# Patient Record
Sex: MALE | Race: WHITE | NOT HISPANIC OR LATINO | Employment: FULL TIME | URBAN - NONMETROPOLITAN AREA
[De-identification: names, ages, dates, MRNs, and addresses within clinical notes are randomized per-mention and may not be internally consistent; named-entity substitution may affect disease eponyms.]

---

## 2023-11-28 ENCOUNTER — APPOINTMENT (OUTPATIENT)
Dept: GENERAL RADIOLOGY | Facility: HOSPITAL | Age: 23
End: 2023-11-28
Payer: OTHER MISCELLANEOUS

## 2023-11-28 ENCOUNTER — HOSPITAL ENCOUNTER (EMERGENCY)
Facility: HOSPITAL | Age: 23
Discharge: OTHER INSTITUTION | End: 2023-11-28
Attending: EMERGENCY MEDICINE
Payer: OTHER MISCELLANEOUS

## 2023-11-28 VITALS
DIASTOLIC BLOOD PRESSURE: 69 MMHG | BODY MASS INDEX: 37.89 KG/M2 | RESPIRATION RATE: 20 BRPM | TEMPERATURE: 98 F | OXYGEN SATURATION: 90 % | HEIGHT: 68 IN | WEIGHT: 250 LBS | HEART RATE: 136 BPM | SYSTOLIC BLOOD PRESSURE: 107 MMHG

## 2023-11-28 DIAGNOSIS — S61.215A LACERATION WITHOUT FOREIGN BODY OF LEFT RING FINGER WITHOUT DAMAGE TO NAIL, INITIAL ENCOUNTER: Primary | ICD-10-CM

## 2023-11-28 DIAGNOSIS — S61.213A LACERATION WITHOUT FOREIGN BODY OF LEFT MIDDLE FINGER WITHOUT DAMAGE TO NAIL, INITIAL ENCOUNTER: ICD-10-CM

## 2023-11-28 PROCEDURE — 99284 EMERGENCY DEPT VISIT MOD MDM: CPT

## 2023-11-28 PROCEDURE — 73130 X-RAY EXAM OF HAND: CPT

## 2023-11-28 PROCEDURE — 25010000002 LIDOCAINE 1 % SOLUTION: Performed by: NURSE PRACTITIONER

## 2023-11-28 RX ORDER — HYDROCODONE BITARTRATE AND ACETAMINOPHEN 5; 325 MG/1; MG/1
1 TABLET ORAL ONCE
Status: COMPLETED | OUTPATIENT
Start: 2023-11-28 | End: 2023-11-28

## 2023-11-28 RX ORDER — LIDOCAINE HYDROCHLORIDE 10 MG/ML
10 INJECTION, SOLUTION INFILTRATION; PERINEURAL ONCE
Status: COMPLETED | OUTPATIENT
Start: 2023-11-28 | End: 2023-11-28

## 2023-11-28 RX ADMIN — HYDROCODONE BITARTRATE AND ACETAMINOPHEN 1 TABLET: 5; 325 TABLET ORAL at 17:18

## 2023-11-28 RX ADMIN — LIDOCAINE HYDROCHLORIDE 10 ML: 10 INJECTION, SOLUTION INFILTRATION; PERINEURAL at 15:55

## 2023-11-28 NOTE — ED PROVIDER NOTES
"Subjective:  History of Present Illness:    Patient is a 23-year-old male.  Presents to the ER today with lacerations to left third and fourth digits.  Patient reports that he was using table saw and accidentally cut these fingers.  Bleeding was controlled upon arrival.  Neurovascular is intact.  Circulation is intact.  Range of motion is intact.  Patient denies OTC medication or home remedy.  Denies blood thinner.  Denies alleviating or exacerbating factors.    Nurses Notes reviewed and agree, including vitals, allergies, social history and prior medical history.     REVIEW OF SYSTEMS: All systems reviewed and not pertinent unless noted.  Review of Systems   Skin:         Laceration to left fourth and fifth digit   All other systems reviewed and are negative.      History reviewed. No pertinent past medical history.    Allergies:    Patient has no known allergies.      No past surgical history on file.      Social History     Socioeconomic History    Marital status: Single         History reviewed. No pertinent family history.    Objective  Physical Exam:  /69   Pulse (!) 136   Temp 98 °F (36.7 °C) (Oral)   Resp 20   Ht 172.7 cm (68\")   Wt 113 kg (250 lb)   SpO2 90%   BMI 38.01 kg/m²      Physical Exam  Vitals and nursing note reviewed.   Constitutional:       Appearance: Normal appearance. He is normal weight.   HENT:      Head: Normocephalic and atraumatic.      Mouth/Throat:      Mouth: Mucous membranes are moist.      Pharynx: Oropharynx is clear.   Eyes:      Extraocular Movements: Extraocular movements intact.      Conjunctiva/sclera: Conjunctivae normal.      Pupils: Pupils are equal, round, and reactive to light.   Cardiovascular:      Rate and Rhythm: Normal rate and regular rhythm.      Pulses: Normal pulses.   Pulmonary:      Effort: Pulmonary effort is normal.   Abdominal:      General: Abdomen is flat.   Musculoskeletal:         General: Normal range of motion.      Cervical back: Normal " range of motion and neck supple.      Comments: Range of motion in left fourth and fifth digit appear to be intact.  Distal neurovascular appears to be intact.   Skin:     General: Skin is warm and dry.      Capillary Refill: Capillary refill takes less than 2 seconds.      Comments: There is lacerations noted to left fourth and fifth digit.   Neurological:      General: No focal deficit present.      Mental Status: He is alert and oriented to person, place, and time. Mental status is at baseline.   Psychiatric:         Mood and Affect: Mood normal.         Behavior: Behavior normal.         Thought Content: Thought content normal.         Judgment: Judgment normal.         Procedures    ED Course:         Lab Results (last 24 hours)       ** No results found for the last 24 hours. **             XR Hand 3+ View Left    Result Date: 11/28/2023  PROCEDURE: XR HAND 3+ VW LEFT-  HISTORY: Laceration  FINDINGS: A three view exam demonstrates curvilinear fracture fragment has been avulsed from the tip of the left fourth distal phalanx. This is associated with significant soft tissue swelling. There is a comminuted fracture involving the base of the left third distal phalanx and the head of the left third middle phalanx. Several small bony fragments are identified along the medial aspect as well as soft tissue swelling and a soft tissue defect..  No other fracture identified.      Impression: Acute fractures as above.      This report was signed and finalized on 11/28/2023 4:07 PM by Evelin Moy MD.          [facility]      Initial impression of presenting illness: Patient is a 23-year-old male.  Presents to the ER today with lacerations to left third and fourth digits.  Patient reports that he was using table saw and accidentally cut these fingers.  Bleeding was controlled upon arrival.  Neurovascular is intact.  Circulation is intact.  Range of motion is intact.  Patient denies OTC medication or home remedy.  Denies blood  thinner.  Denies alleviating or exacerbating factors.    DDX: includes but is not limited to: Abrasion, contusion, laceration    Patient arrives stable with vitals interpreted by myself.     Pertinent features from physical exam: Left fourth and fifth digit with laceration to the DIP joint.  Range of motion appear to be intact.  Flexor is delayed on fourth digit.  Circulation appears to be intact    Initial diagnostic plan: X-ray of right hand    Results from initial plan were reviewed and interpreted by me revealing x-ray of right hand with the following impression a three-view exam demonstrates gold for near fracture fragment has been avulsed from the tip of the left fourth distal phalanx.  This is associated with significant soft tissue swelling.  There is a communicated fracture involving the base of the left third distal phalanx and the head of the third middle phalanx.  Several small bony fragments are identified along the medial aspect as well as the soft tissue swelling and a soft tissue defect.  No other fracture identified.    Diagnostic information from other sources: Chart review    Interventions / Re-evaluation: Wound was cleaned while in ER.  Telfa was applied with loose Kerlix.    Results/clinical rationale were discussed with patient    Consultations/Discussion of results with other physicians: Spoke with Dr. Pool a transfer doctor at .  Also spoke with Dr. Carpio hand surgeon.  He request that patient he request that patient be transferred to [facility] ER for further evaluation by hand surgery.    Disposition plan: Will allow patient to go per POV to [facility] for further evaluation.  -----        Final diagnoses:   Laceration without foreign body of left ring finger without damage to nail, initial encounter   Laceration without foreign body of left middle finger without damage to nail, initial encounter          Ari Hart, APRN  11/28/23 1736

## 2023-11-28 NOTE — DISCHARGE INSTRUCTIONS
Please go straight to [facility] for further evaluation of left third and fourth digit.  Follow-up with your PCP in 1 week.